# Patient Record
Sex: MALE | Race: WHITE | URBAN - METROPOLITAN AREA
[De-identification: names, ages, dates, MRNs, and addresses within clinical notes are randomized per-mention and may not be internally consistent; named-entity substitution may affect disease eponyms.]

---

## 2024-10-10 ENCOUNTER — HOSPITAL ENCOUNTER (OUTPATIENT)
Dept: GENERAL RADIOLOGY | Age: 64
Discharge: HOME | End: 2024-10-10
Payer: COMMERCIAL

## 2024-10-10 ENCOUNTER — HOSPITAL ENCOUNTER (OUTPATIENT)
Age: 64
Discharge: HOME | End: 2024-10-10
Payer: COMMERCIAL

## 2024-10-10 DIAGNOSIS — M47.812 CERVICAL SPONDYLOSIS WITHOUT MYELOPATHY: ICD-10-CM

## 2024-10-10 PROCEDURE — 72040 X-RAY EXAM NECK SPINE 2-3 VW: CPT

## 2024-10-30 ENCOUNTER — HOSPITAL ENCOUNTER (OUTPATIENT)
Dept: PHYSICAL THERAPY | Age: 64
Setting detail: THERAPIES SERIES
Discharge: HOME | End: 2024-10-30
Payer: COMMERCIAL

## 2024-10-30 DIAGNOSIS — M54.12 CERVICAL RADICULOPATHY: ICD-10-CM

## 2024-10-30 DIAGNOSIS — M54.2 NECK PAIN: Primary | ICD-10-CM

## 2024-10-30 PROCEDURE — 97140 MANUAL THERAPY 1/> REGIONS: CPT

## 2024-10-30 PROCEDURE — 97110 THERAPEUTIC EXERCISES: CPT

## 2024-10-30 PROCEDURE — 97161 PT EVAL LOW COMPLEX 20 MIN: CPT

## 2024-10-30 PROCEDURE — 97112 NEUROMUSCULAR REEDUCATION: CPT

## 2024-10-30 NOTE — PLAN OF CARE
[facility] [address] [place] [address] office: [phone] fax: [phone]     Physical Therapy Initial Evaluation Certification      Dear Erickson Yanez DO ,    We had the pleasure of evaluating the following patient for physical therapy services at [facility].  A summary of our findings can be found in the initial assessment below.  This includes our plan of care.  If you have any questions or concerns regarding these findings, please do not hesitate to contact me at the office phone number listed above.  Thank you for the referral.     Physician Signature:_______________________________Date:__________________  By signing above (or electronic signature), therapist’s plan is approved by physician       Physical Therapy: TREATMENT/PROGRESS NOTE   Patient: Shar Burroughs (64 y.o. male)   Examination Date: 10/30/2024   :  1960 MRN: [id number]   Visit #: 1   Insurance Allowable Auth Needed   [place] []Yes    []No    Insurance: Payor: BCBS / Plan: BCBS OUT OF STATE / Product Type: *No Product type* /   Insurance ID: [id number] - ([facility])  Secondary Insurance (if applicable):    Treatment Diagnosis:     ICD-10-CM    1. Neck pain  M54.2       2. Cervical radiculopathy  M54.12          Medical Diagnosis:  Spondylosis without myelopathy or radiculopathy, cervical region [M47.812]   Referring Physician: Erickson Yanez DO  PCP: Yoli Yanez MD     Plan of care signed (Y/N):     Date of Patient follow up with Physician:      Plan of Care Report: EVAL today  POC update due: (10 visits /OR AUTH LIMITS, whichever is less) 2024                                             Medical History:  Comorbidities:  Hypertension  Osteoarthritis  Rheumatoid Arthritis  Other: cochlear implant bilaterally  Relevant Medical History: no significant PMH                                         Precautions/ Contra-indications:

## 2024-11-05 ENCOUNTER — HOSPITAL ENCOUNTER (OUTPATIENT)
Dept: PHYSICAL THERAPY | Age: 64
Setting detail: THERAPIES SERIES
Discharge: HOME | End: 2024-11-05
Payer: COMMERCIAL

## 2024-11-05 ENCOUNTER — APPOINTMENT (OUTPATIENT)
Dept: PHYSICAL THERAPY | Age: 64
End: 2024-11-05
Payer: COMMERCIAL

## 2024-11-05 PROCEDURE — 97112 NEUROMUSCULAR REEDUCATION: CPT | Performed by: PHYSICAL THERAPY ASSISTANT

## 2024-11-05 PROCEDURE — 97140 MANUAL THERAPY 1/> REGIONS: CPT | Performed by: PHYSICAL THERAPY ASSISTANT

## 2024-11-05 PROCEDURE — 97110 THERAPEUTIC EXERCISES: CPT | Performed by: PHYSICAL THERAPY ASSISTANT

## 2024-11-05 NOTE — FLOWSHEET NOTE
[facility] [address], [place] [address] office: [phone] fax: [phone]         Physical Therapy: TREATMENT/PROGRESS NOTE   Patient: Shar Burroughs (64 y.o. male)   Examination Date: 2024   :  1960 MRN: [id number]   Visit #: 2   Insurance Allowable Auth Needed   [place] []Yes    []No    Insurance: Payor: BCBS / Plan: BCBS OUT OF STATE / Product Type: *No Product type* /   Insurance ID: [id number] - ([facility] [facility] [employer])  Secondary Insurance (if applicable):    Treatment Diagnosis:     ICD-10-CM    1. Neck pain  M54.2       2. Cervical radiculopathy  M54.12          Medical Diagnosis:  Spondylosis without myelopathy or radiculopathy, cervical region [M47.812]   Referring Physician: Yoli Yanez MD  PCP: Yoli Yanez MD     Plan of care signed (Y/N):     Date of Patient follow up with Physician:      Plan of Care Report: NO  POC update due: (10 visits /OR AUTH LIMITS, whichever is less) 2024                                             Medical History:  Comorbidities:  Hypertension  Osteoarthritis  Rheumatoid Arthritis  Other: cochlear implant bilaterally  Relevant Medical History: no significant PMH                                         Precautions/ Contra-indications:           Latex allergy:  NO  Pacemaker:    NO  Contraindications for Manipulation: None  Date of Surgery: n/a  Other:    Red Flags:  None    Suicide Screening:   The patient did not verbalize a primary behavioral concern, suicidal ideation, suicidal intent, or demonstrate suicidal behaviors.    Preferred Language for Healthcare:   [x] English       [] other:    SUBJECTIVE EXAMINATION     Patient stated complaint:        Test used Initial score  10/30/24 2024   Pain Summary VAS 2-3/10 currently  10/10 at worst    Functional questionnaire Neck Disability Index 14/50 = 28%    Other:              Pain:  Pain location: R upper arm, neck, and R thumb

## 2024-11-12 ENCOUNTER — APPOINTMENT (OUTPATIENT)
Dept: PHYSICAL THERAPY | Age: 64
End: 2024-11-12
Payer: COMMERCIAL

## 2024-11-12 ENCOUNTER — HOSPITAL ENCOUNTER (OUTPATIENT)
Dept: PHYSICAL THERAPY | Age: 64
Setting detail: THERAPIES SERIES
Discharge: HOME | End: 2024-11-12
Payer: COMMERCIAL

## 2024-11-12 PROCEDURE — 97110 THERAPEUTIC EXERCISES: CPT | Performed by: PHYSICAL THERAPY ASSISTANT

## 2024-11-12 PROCEDURE — 97112 NEUROMUSCULAR REEDUCATION: CPT | Performed by: PHYSICAL THERAPY ASSISTANT

## 2024-11-12 PROCEDURE — 97140 MANUAL THERAPY 1/> REGIONS: CPT | Performed by: PHYSICAL THERAPY ASSISTANT

## 2024-11-12 NOTE — FLOWSHEET NOTE
[facility] [address], [place] [address] office: [phone] fax: [phone]         Physical Therapy: TREATMENT/PROGRESS NOTE   Patient: Shar Burroughs (64 y.o. male)   Examination Date: 2024   :  1960 MRN: [id number]   Visit #: 3   Insurance Allowable Auth Needed   [place] []Yes    []No    Insurance: Payor: BCBS / Plan: BCBS OUT OF STATE / Product Type: *No Product type* /   Insurance ID: [id number] - ([place] [employer])  Secondary Insurance (if applicable):    Treatment Diagnosis:     ICD-10-CM    1. Neck pain  M54.2       2. Cervical radiculopathy  M54.12          Medical Diagnosis:  Spondylosis without myelopathy or radiculopathy, cervical region [M47.812]   Referring Physician: Yoli Yanez MD  PCP: Yoli Yanez MD     Plan of care signed (Y/N):     Date of Patient follow up with Physician:      Plan of Care Report: NO  POC update due: (10 visits /OR AUTH LIMITS, whichever is less) 2024                                             Medical History:  Comorbidities:  Hypertension  Osteoarthritis  Rheumatoid Arthritis  Other: cochlear implant bilaterally  Relevant Medical History: no significant PMH                                         Precautions/ Contra-indications:           Latex allergy:  NO  Pacemaker:    NO  Contraindications for Manipulation: None  Date of Surgery: n/a  Other:    Red Flags:  None    Suicide Screening:   The patient did not verbalize a primary behavioral concern, suicidal ideation, suicidal intent, or demonstrate suicidal behaviors.    Preferred Language for Healthcare:   [x] English       [] other:    SUBJECTIVE EXAMINATION     Patient stated complaint: Notes that after last visit he was a little sore but did OK.  He does have increased right arm pain to the elbow.  He notes a significant soreness when trying to posture correct during driving.  He is working on his HEP and doing OK.        Test used

## 2024-11-19 ENCOUNTER — APPOINTMENT (OUTPATIENT)
Dept: PHYSICAL THERAPY | Age: 64
End: 2024-11-19
Payer: COMMERCIAL

## 2024-11-19 ENCOUNTER — HOSPITAL ENCOUNTER (OUTPATIENT)
Dept: PHYSICAL THERAPY | Age: 64
Setting detail: THERAPIES SERIES
Discharge: HOME | End: 2024-11-19
Payer: COMMERCIAL

## 2024-11-19 PROCEDURE — 97110 THERAPEUTIC EXERCISES: CPT | Performed by: PHYSICAL THERAPY ASSISTANT

## 2024-11-19 PROCEDURE — 97112 NEUROMUSCULAR REEDUCATION: CPT | Performed by: PHYSICAL THERAPY ASSISTANT

## 2024-11-19 PROCEDURE — 97140 MANUAL THERAPY 1/> REGIONS: CPT | Performed by: PHYSICAL THERAPY ASSISTANT

## 2024-11-19 NOTE — FLOWSHEET NOTE
training.   Manual Therapy (60920) as indicated to include: Passive Range of Motion, Gr I-IV mobilizations, Soft Tissue Mobilization, and Dry Needling/IASTM  Modalities as needed that may include: Cryotherapy, Electrical Stimulation, Thermal Agents, and Traction  Patient education on joint protection, postural re-education, activity modification, and progression of HEP    Plan: Cont POC- Continue emphasis/focus on exercise progression, improving proper muscle recruitment and activation/motor control patterns, modulating pain, promoting relaxation, increasing ROM, reduce/eliminate soft tissue swelling/inflammation/restriction, allowing for proper ROM, and improving postural awareness. Next visit plan to progress reps and add new exercises     Electronically Signed by Lisbeth Alvarez, IESHA  Date: 11/19/2024     Note: Portions of this note have been templated and/or copied from initial evaluation, reassessments and prior notes for documentation efficiency.    Note: If patient does not return for scheduled/recommended follow up visits, this note will serve as a discharge from care along with the most recent update on progress.    Ortho Evaluation

## 2024-11-26 ENCOUNTER — HOSPITAL ENCOUNTER (OUTPATIENT)
Dept: PHYSICAL THERAPY | Age: 64
Setting detail: THERAPIES SERIES
Discharge: HOME | End: 2024-11-26
Payer: COMMERCIAL

## 2024-11-26 ENCOUNTER — APPOINTMENT (OUTPATIENT)
Dept: PHYSICAL THERAPY | Age: 64
End: 2024-11-26
Payer: COMMERCIAL

## 2024-11-26 PROCEDURE — 97110 THERAPEUTIC EXERCISES: CPT | Performed by: PHYSICAL THERAPY ASSISTANT

## 2024-11-26 PROCEDURE — 97112 NEUROMUSCULAR REEDUCATION: CPT | Performed by: PHYSICAL THERAPY ASSISTANT

## 2024-11-26 PROCEDURE — 97140 MANUAL THERAPY 1/> REGIONS: CPT | Performed by: PHYSICAL THERAPY ASSISTANT

## 2024-11-26 NOTE — FLOWSHEET NOTE
Saturday and felt ok.  He did get in contact with MD and they are going to go ahead and order an MRI.      Test used Initial score  10/30/24 11/26/2024   Pain Summary VAS 2-3/10 currently  10/10 at worst    Functional questionnaire Neck Disability Index 14/50 = 28%    Other:              Pain:  Pain location: R upper arm, neck, and R thumb numbness  Patient describes pain to be constant, aching, throbbing, pressure, and numbness  Pain decreases with: Resting  Pain increases with: Activity and Movement, Reaching, Throwing, and lifting     Living status: lives at home with wife    Occupation/School:  Work/School Status: Full time  Job Duties/Demands: Sedentary  Prolonged Sitting    Hand Dominance: Right    Sport/ Recreation/ Leisure/ Hobbies: likes to play pickleball and do yard work    Review Of Systems (ROS):  [x] Performed Review of systems (Integumentary, CardioPulmonary, Neurological) by intake and observation. Intake form has been scanned into medical record. Patient has been instructed to contact their primary care physician regarding ROS issues if not already being addressed at this time.    [x] Patient history, allergies, meds reviewed. Medical chart reviewed. See intake form.     OBJECTIVE EXAMINATION     10/30/24  ROM/Strength: (Blank cells denote NT)    Mvmt (norm) AROM L AROM R Notes PROM L PROM R Notes     CERVICAL Flex (60) 35 deg       Ext (70) 14 deg p!       SB(45) 14 deg 12 deg p!        Rotation (80) 60 deg 30 degp!           SHOULDER Flexion (180) 150 deg 150 deg        Abduction (180)          ER -0          ER -90 (90)          IR -0          IR -90 (70)           ELBOW Flex/biceps (140)          Ext/triceps (0)          Pronation (80)          Supination (80)             WRIST Flexion (60)          Extension (60)          RD (20)          UD (20)                         MMT L MMT R Notes     CERVICAL Cerv flexion 3/5      Cerv extension 4/5      Cerv SB       Cerv rotation          SHOULDER

## 2024-12-03 ENCOUNTER — HOSPITAL ENCOUNTER (OUTPATIENT)
Dept: PHYSICAL THERAPY | Age: 64
Setting detail: THERAPIES SERIES
Discharge: HOME | End: 2024-12-03
Payer: COMMERCIAL

## 2024-12-03 PROCEDURE — 97110 THERAPEUTIC EXERCISES: CPT | Performed by: PHYSICAL THERAPY ASSISTANT

## 2024-12-03 PROCEDURE — 97140 MANUAL THERAPY 1/> REGIONS: CPT | Performed by: PHYSICAL THERAPY ASSISTANT

## 2024-12-03 PROCEDURE — 97112 NEUROMUSCULAR REEDUCATION: CPT | Performed by: PHYSICAL THERAPY ASSISTANT

## 2024-12-03 NOTE — PLAN OF CARE
reaching overhead, carrying items, lifting items, pushing or pulling activity, ADLs, heavy home activity, and yardwork .  During therapy this date, patient required verbal cueing, modification of technique, progression of exercises and program, and manual interventions for exercise progression, improving proper muscle recruitment and activation/motor control patterns, modulating pain, promoting relaxation, increasing ROM, improving soft tissue extensibility, allowing for proper ROM, and improving postural awareness. Patient will continue to benefit from ongoing evaluation and advanced clinical decision from a Physical Therapist to improve pain control, ROM, endurance, proper body mechanics, and tolerance to work activity to safely return to PLOF without symptoms or restrictions.    Medical Necessity Documentation:  I certify that this patient meets the below criteria necessary for medical necessity for care and/or justification of therapy services:  The patient has functional impairments and/or activity limitations and would benefit from continued outpatient therapy services to address the deficits outlined in the patients goals    Return to Play: NA    Prognosis for POC: [x] Good [] Fair  [] Poor    Patient requires continued skilled intervention: [x] Yes  [] No      CHARGE CAPTURE     PT CHARGE GRID   CPT Code (TIMED) minutes # CPT Code (UNTIMED) #     Therex (75723)  16 1  EVAL:LOW (35195 - Typically 20 minutes face-to-face)     Neuromusc. Re-ed (60538) 15 1  Re-Eval (33184)     Manual (14831) 15 1  Estim Unattended (16254)     Ther. Act (54595)    [facility]. Traction (07611)     Gait (03396)    Dry Needle 1-2 muscle (29741)     Aquatic Therex (60703)    Dry Needle 3+ muscle (20561)     Iontophoresis (73097)    VASO (13799)     Ultrasound (31916)    Group Therapy (36278)     Estim Attended (74478)    Canalith Repositioning (04226)     Physical Performance Test (30405)         Other:    Other:    Total Timed Code Tx

## 2024-12-10 ENCOUNTER — HOSPITAL ENCOUNTER (OUTPATIENT)
Dept: PHYSICAL THERAPY | Age: 64
Setting detail: THERAPIES SERIES
Discharge: HOME | End: 2024-12-10
Payer: COMMERCIAL

## 2024-12-10 PROCEDURE — 97110 THERAPEUTIC EXERCISES: CPT | Performed by: PHYSICAL THERAPY ASSISTANT

## 2024-12-10 PROCEDURE — 97140 MANUAL THERAPY 1/> REGIONS: CPT | Performed by: PHYSICAL THERAPY ASSISTANT

## 2024-12-10 PROCEDURE — 97112 NEUROMUSCULAR REEDUCATION: CPT | Performed by: PHYSICAL THERAPY ASSISTANT

## 2024-12-10 NOTE — FLOWSHEET NOTE
UD (20)                         MMT L MMT R Notes     CERVICAL Cerv flexion 3/5      Cerv extension 4/5      Cerv SB       Cerv rotation          SHOULDER Flexion       Abduction       ER -0       ER -90       IR -0       IR -90        ELBOW Flex/biceps       Ext/triceps       Pronation       supination          WRIST Flexion       Extension       RD       UD                 Palpation:   Patient reported tenderness with palpation Increased muscle tone noted  Location:R upper trap and R scalenes    Posture:   forward head and rounded shoulders    Bandages/Dressings/Incisions:  Not Applicable    Dermatomes: Abnormal findings listed below  None, all WNL  C6 Thumb numb and tingly    Myotomes: Abnormal findings listed below  None, all WNL    Reflexes: Abnormal findings listed below  Not Tested    Specific Joint Mobility Testing/Accessory Motions:      Cervical: hypomobility of R side    Thoracic: hypomobility of T1 T2 T3 T4  Glenohumeral joint: WNL    Gait:    Pattern: WNL  Assistive Device Used: no AD    Special Tests:  [] None Assessed   [] Following tests noted:    Distraction: no relief noted    Balance:  [x] WNL      [] NT       [] Dysfunction noted  Comment:     Falls Risk Assessment (30 days):   Falls Risk assessed and no intervention required.  Time Up and Go (TUG):   Not Assessed        Exercises/Interventions     Therapeutic Ex (39283)  resistance Sets/time Reps Notes/Cues/Progressions                 T-Band Row with scap squeeze  Green   x30    T-Band Extension  Green   x20            T-Band B ER  Red   x20           PRE flexion / Scaption    2x15    Poture push    x10 3\" hold            Levator stretch  3x30\" ea     Upper trap stretch  3x30\" ea     Doorway stretch  10x10\"     Isometric cervical flex  10x5\"            Supine punch    x20    Supine open book  3\" hold   x20    Supine alternating shoulder flexion    x20           Manual Intervention (03078)  TIME     Distraction   8'     STM to

## 2024-12-17 ENCOUNTER — APPOINTMENT (OUTPATIENT)
Dept: PHYSICAL THERAPY | Age: 64
End: 2024-12-17
Payer: COMMERCIAL

## 2024-12-24 ENCOUNTER — APPOINTMENT (OUTPATIENT)
Dept: PHYSICAL THERAPY | Age: 64
End: 2024-12-24
Payer: COMMERCIAL

## 2024-12-31 ENCOUNTER — APPOINTMENT (OUTPATIENT)
Dept: PHYSICAL THERAPY | Age: 64
End: 2024-12-31
Payer: COMMERCIAL